# Patient Record
Sex: MALE | Race: WHITE | Employment: FULL TIME | URBAN - METROPOLITAN AREA
[De-identification: names, ages, dates, MRNs, and addresses within clinical notes are randomized per-mention and may not be internally consistent; named-entity substitution may affect disease eponyms.]

---

## 2018-11-02 ENCOUNTER — APPOINTMENT (OUTPATIENT)
Dept: PHYSICAL THERAPY | Age: 48
End: 2018-11-02

## 2018-11-08 ENCOUNTER — HOSPITAL ENCOUNTER (OUTPATIENT)
Dept: PHYSICAL THERAPY | Age: 48
Discharge: HOME | End: 2018-11-08
Payer: COMMERCIAL

## 2018-11-08 PROCEDURE — 97140 MANUAL THERAPY 1/> REGIONS: CPT

## 2018-11-08 PROCEDURE — 97162 PT EVAL MOD COMPLEX 30 MIN: CPT

## 2018-11-08 PROCEDURE — 97535 SELF CARE MNGMENT TRAINING: CPT

## 2018-11-08 NOTE — PROGRESS NOTES
PHYSICAL THERAPY - DAILY TREATMENT NOTE    Patient Name: Loki Flores        Date: 2018  : 1970   YES Patient  Verified  Visit #:      12  Insurance: Payor: /      In time: 11:00 Out time: 11:40   Total Treatment Time: 40     Medicare Time Tracking (below)   Total Timed Codes (min):  na 1:1 Treatment Time:  na     TREATMENT AREA =  Pain in right shoulder [M25.511]    SUBJECTIVE  Pain Level (on 0 to 10 scale):    Medication Changes/New allergies or changes in medical history, any new surgeries or procedures? NO    If yes, update Summary List   Subjective Functional Status/Changes:  []  No changes reported     SEE IE          OBJECTIVE    10 min Manual Therapy: DTM R pec, R rib mob, C6-7 mob with R Rot   Rationale:      decrease pain, increase ROM and increase tissue extensibility to improve patient's ability to perform overhead activities     min Patient Education:  YES  Reviewed HEP   []  Progressed/Changed HEP based on: Other Objective/Functional Measures:    SEE IE     Post Treatment Pain Level (on 0 to 10) scale:       ASSESSMENT  Assessment/Changes in Function:     SEE IE     []  See Progress Note/Recertification   Patient will continue to benefit from skilled PT services to modify and progress therapeutic interventions, address functional mobility deficits, address ROM deficits, address strength deficits, analyze and address soft tissue restrictions, analyze and cue movement patterns, analyze and modify body mechanics/ergonomics and assess and modify postural abnormalities to attain remaining goals.    Progress toward goals / Updated goals:         PLAN  []  Upgrade activities as tolerated YES Continue plan of care   []  Discharge due to :    []  Other:      Therapist: Colletta Collin, PT, OCS, SCS, CSCS    Date: 2018 Time: 11:38 AM       Future Appointments   Date Time Provider Ez Grant   2018  3:00 PM Ki Vincent, PT [facility]

## 2018-11-08 NOTE — PROGRESS NOTES
[address] [place] [address], [address], [address] [facility] - Phone: [phone]  Fax: [phone] OF CARE / [address]  Patient Name: Radha Pritchett : 1970   Medical   Diagnosis: Pain in right shoulder [M25.511] Treatment Diagnosis: Pain in right shoulder [M25.511]   Onset Date: ~2mo ago     Referral Source: Khanh Rosen MD Start of Care [facility]): 2018   Prior Hospitalization: See medical history Provider #: [id number]   Prior Level of Function: Pain free overhead activities   Comorbidities: none   Medications: Verified on Patient Summary List   The Plan of Care and following information is based on the information from the initial evaluation.   ===========================================================================================  Assessment / key information:  Radha Pritchett is a 51 y.o.  yo male with Dx of Pain in right shoulder [M25.511]. He reports insidious onset of R shoulder pain ~2month ago. He currently rates his pain as 5/10 at worst, 0/10 at best, primarily located at anterolateral aspect of his R shoulder. He complains of difficulty and increase pain with overhead activities. Objective Findings:  Shoulder AROM:  IR (behind the back) : R = T11, L = T6. Manual Muscle Testing:  Wrist ext /Flx are Reduced. All other UE strength are WNL. Observation:  Excessive abduction and IR of R scapula noted. Decreased posterior tilt and upwards rotation of scapular on R.   Pt instructed in HEP and will f/u in clinic for PT.  ===========================================================================================  Eval Complexity: History MEDIUM  Complexity : 1-2 comorbidities / personal factors will impact the outcome/ POC ;  Examination  MEDIUM Complexity : 3 Standardized tests and measures addressing body structure, function, activity limitation and / or participation in recreation ; Presentation MEDIUM Complexity : Evolving with changing characteristics ; Decision Making LOW Complexity : FOTO score of ; Overall Complexity MEDIUM  Problem List: pain affecting function, decrease ROM, decrease strength, decrease ADL/ functional abilitiies, decrease activity tolerance and decrease flexibility/ joint mobility   Treatment Plan may include any combination of the following: Therapeutic exercise, Therapeutic activities, Neuromuscular re-education, Physical agent/modality, Manual therapy, Patient education, Self Care training and Functional mobility training  Patient / Family readiness to learn indicated by: asking questions, trying to perform skills and interest  Persons(s) to be included in education: patient (P)  Barriers to Learning/Limitations: no  Measures taken: FOTO = 82%   Patient Goal (s): Decrease pain    Patient self reported health status: excellent  Rehabilitation Potential: good   Short Term Goals: To be accomplished in  1-2  weeks:  1. Independent with HEP. 2. Decrease max pain 25-50% to assist with overhead activities   Long Term Goals: To be accomplished in  3-4  weeks:  1. Decrease max pain 50-75% to assist with overhead activities  2. Increase FOTO score to 83% to show functional improvment  3. Will rate  >/= +5 on Global Rating of Change and be prepared to DC to HEP. Frequency / Duration:   Patient to be seen  2-3  times per week for 3-4  weeks:  Patient / Caregiver education and instruction: self care and exercises    Therapist Signature: Charls Dakin, DPT, OCS, SCS, CSCS Date: 56/2/4655   Certification Period: na Time: 11:40 AM   =================================================================================I certify that the above Physical Therapy Services are being furnished while the patient is under my care. I agree with the treatment plan and certify that this therapy is necessary.     Physician Signature:        Date:       Time:     Please sign and return to [facility] or you may fax the signed copy to [phone]. Thank you.

## 2018-11-13 ENCOUNTER — HOSPITAL ENCOUNTER (OUTPATIENT)
Dept: PHYSICAL THERAPY | Age: 48
Discharge: HOME | End: 2018-11-13
Payer: COMMERCIAL

## 2018-11-13 PROCEDURE — 97140 MANUAL THERAPY 1/> REGIONS: CPT

## 2018-11-13 PROCEDURE — 97110 THERAPEUTIC EXERCISES: CPT

## 2018-11-13 NOTE — PROGRESS NOTES
PHYSICAL THERAPY - DAILY TREATMENT NOTE    Patient Name: Katelyn Dunbar        Date: 2018  : 1970   YES Patient  Verified  Visit #:     Insurance: Payor: Caitlin Youngblood / Plan: [place] [employer] [place] / Product Type: PPO /      In time: 3:00 Out time: 3:30   Total Treatment Time: 30     Medicare Time Tracking (below)   Total Timed Codes (min):  na 1:1 Treatment Time:  na     TREATMENT AREA =  Pain in right shoulder [M25.511]    SUBJECTIVE  Pain Level (on 0 to 10 scale):  0  / 10   Medication Changes/New allergies or changes in medical history, any new surgeries or procedures? NO    If yes, update Summary List   Subjective Functional Status/Changes:  []  No changes reported     I stll feel a tiny bit when Im all the way up with my arm, but I can definitely raise it a little better          OBJECTIVE      20 min Therapeutic Exercise:  [x]  See flow sheet   Rationale:      increase ROM, increase strength and improve coordination to improve the patients ability to perform overhead activities     10 min Manual Therapy: DTM R pec, Post cuff, R rib mob, C6-7 mob with R Rot   Rationale:      decrease pain, increase ROM and increase tissue extensibility to improve patient's ability to perform overhead activities     min Patient Education:  YES  Reviewed HEP   []  Progressed/Changed HEP based on:         Other Objective/Functional Measures:    Min difference in flx ROM      Post Treatment Pain Level (on 0 to 10) scale:   0  / 10     ASSESSMENT  Assessment/Changes in Function:     Good katie to all Rx without increase in pain      []  See Progress Note/Recertification   Patient will continue to benefit from skilled PT services to modify and progress therapeutic interventions, address functional mobility deficits, address ROM deficits, address strength deficits, analyze and address soft tissue restrictions, analyze and cue movement patterns, analyze and modify body mechanics/ergonomics and assess and modify postural abnormalities to attain remaining goals. Progress toward goals / Updated goals:     Independent with HEP     PLAN  []  Upgrade activities as tolerated YES Continue plan of care   []  Discharge due to :    []  Other:      Therapist: Major Brown, PT, OCS, SCS, CSCS    Date: 11/13/2018 Time: 9:57 AM       Future Appointments   Date Time Provider Ez Grant   11/13/2018  3:00 PM Oswald Lowery, PT [facility]

## 2018-11-28 ENCOUNTER — HOSPITAL ENCOUNTER (OUTPATIENT)
Dept: PHYSICAL THERAPY | Age: 48
Discharge: HOME | End: 2018-11-28
Payer: COMMERCIAL

## 2018-11-28 PROCEDURE — 97110 THERAPEUTIC EXERCISES: CPT

## 2018-11-28 PROCEDURE — 97140 MANUAL THERAPY 1/> REGIONS: CPT

## 2018-11-28 NOTE — PROGRESS NOTES
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 Pt treatment has consisted of  therapeutic exercise for shoulder ROM, shoulder/scapular stability, and manual therapy (jt mobilization and deep tissue mobilization/IASTM)  CURRENT STATUS  Pt has had a good tolerance to physical therapy treatment. He reports that he is now able to perform overhead lifting with only minor discomfort. He continues to present with increased soft tissue tension at posterior cuff  However, we believe that he will be able to continue to progress with hisfunction independently at this point. Goal/Measure of Progress Goal Met? 1. Decrease Max pain by 50-75% to assist with ADLs   Status at last Eval: 5/10 Current Status: 0/10 yes   2. Increase FOTO score to 83 % show functional improvement   Status at last Eval: 82% Current Status: 100% yes   3. Will rate >/= +5 on Global Rating of Change and be prepared to DC to HEP. Status at last Eval: na Current Status: +7 yes       RECOMMENDATIONS  Discharge from physical therapy treatment with HEP. If you have any questions/comments please contact us directly at [phone]. Thank you for allowing us to assist in the care of your patient.     Therapist Signature: Keila Bunn DPT, OCS, SCS, CSCS Date: 2018     Time: 11:37 AM

## 2018-11-28 NOTE — PROGRESS NOTES
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 NO    If yes, update Summary List   Subjective Functional Status/Changes:  []  No changes reported     I even did overhead press. I feel something is there, but no real pain          OBJECTIVE  20 min Therapeutic Exercise:  [x]  See flow sheet   Rationale:      increase ROM, increase strength and improve coordination to improve the patients ability to perform overhead activities     10 min Manual Therapy: DTM R pec, Post cuff, R rib mob, C6-7 mob with R Rot   Rationale:      decrease pain, increase ROM and increase tissue extensibility to improve patient's ability to perform overhead activities     min Patient Education:  YES  Reviewed HEP   []  Progressed/Changed HEP based on:         Other Objective/Functional Measures:    FOTO = 100  GROC = +7     Post Treatment Pain Level (on 0 to 10) scale:   0  / 10     ASSESSMENT  Assessment/Changes in Function:     Independent with all Ex     []  See Progress Note/Recertification      Progress toward goals / Updated goals:    See DC     PLAN  []  Upgrade activities as tolerated  Continue plan of care   [x]  Discharge due to : Met goals   []  Other:      Therapist: Luciano Hayes, PT, OCS, SCS, CSCS    Date: 2018 Time: 9:04 AM       Future Appointments   Date Time Provider Ez Grant   2018 10:30 AM ASHANTI Pratt [facility]